# Patient Record
Sex: FEMALE | Race: OTHER | HISPANIC OR LATINO | Employment: FULL TIME | URBAN - METROPOLITAN AREA
[De-identification: names, ages, dates, MRNs, and addresses within clinical notes are randomized per-mention and may not be internally consistent; named-entity substitution may affect disease eponyms.]

---

## 2017-06-29 ENCOUNTER — ALLSCRIPTS OFFICE VISIT (OUTPATIENT)
Dept: OTHER | Facility: OTHER | Age: 42
End: 2017-06-29

## 2017-06-29 ENCOUNTER — LAB REQUISITION (OUTPATIENT)
Dept: LAB | Facility: HOSPITAL | Age: 42
End: 2017-06-29
Payer: COMMERCIAL

## 2017-06-29 DIAGNOSIS — Z01.419 ENCOUNTER FOR GYNECOLOGICAL EXAMINATION WITHOUT ABNORMAL FINDING: ICD-10-CM

## 2017-06-29 PROCEDURE — 88175 CYTOPATH C/V AUTO FLUID REDO: CPT | Performed by: OBSTETRICS & GYNECOLOGY

## 2017-06-29 PROCEDURE — 87624 HPV HI-RISK TYP POOLED RSLT: CPT | Performed by: OBSTETRICS & GYNECOLOGY

## 2017-07-05 LAB — HPV RRNA GENITAL QL NAA+PROBE: ABNORMAL

## 2017-07-12 LAB
LAB AP GYN PRIMARY INTERPRETATION: NORMAL
LAB AP LMP: NORMAL
Lab: NORMAL
PATH INTERP SPEC-IMP: NORMAL

## 2018-01-14 VITALS
BODY MASS INDEX: 24.21 KG/M2 | DIASTOLIC BLOOD PRESSURE: 60 MMHG | SYSTOLIC BLOOD PRESSURE: 102 MMHG | HEIGHT: 64 IN | WEIGHT: 141.8 LBS

## 2020-03-25 ENCOUNTER — OFFICE VISIT (OUTPATIENT)
Dept: OBGYN CLINIC | Facility: MEDICAL CENTER | Age: 45
End: 2020-03-25
Payer: COMMERCIAL

## 2020-03-25 VITALS — DIASTOLIC BLOOD PRESSURE: 62 MMHG | SYSTOLIC BLOOD PRESSURE: 100 MMHG | BODY MASS INDEX: 24.55 KG/M2 | WEIGHT: 143 LBS

## 2020-03-25 DIAGNOSIS — N92.6 IRREGULAR MENSES: ICD-10-CM

## 2020-03-25 DIAGNOSIS — B97.7 HPV (HUMAN PAPILLOMA VIRUS) INFECTION: ICD-10-CM

## 2020-03-25 DIAGNOSIS — Z71.89 ENCOUNTER FOR MEDICATION COUNSELING: ICD-10-CM

## 2020-03-25 DIAGNOSIS — R10.2 PELVIC PAIN: Primary | ICD-10-CM

## 2020-03-25 DIAGNOSIS — Z12.4 CERVICAL CANCER SCREENING: ICD-10-CM

## 2020-03-25 PROCEDURE — 99214 OFFICE O/P EST MOD 30 MIN: CPT | Performed by: NURSE PRACTITIONER

## 2020-03-25 RX ORDER — MEDROXYPROGESTERONE ACETATE 10 MG/1
TABLET ORAL
Qty: 10 TABLET | Refills: 2 | Status: SHIPPED | OUTPATIENT
Start: 2020-03-25 | End: 2021-07-16 | Stop reason: ALTCHOICE

## 2020-03-25 RX ORDER — SPIRONOLACTONE 50 MG/1
TABLET, FILM COATED ORAL
COMMUNITY
Start: 2019-12-29 | End: 2021-07-16 | Stop reason: ALTCHOICE

## 2020-03-25 NOTE — ASSESSMENT & PLAN NOTE
Neg UPT  Uterine bleeding present on today's exam  Recommended continuing to observe cycles and reviewed indications for active management  Rx was provided for Provera for 10x10 use PRN  Reviewed risks/benefits, driections for use  Advised always checking UPT prior to taking

## 2020-03-25 NOTE — PROGRESS NOTES
Assessment/Plan:    Irregular menses  Neg UPT  Uterine bleeding present on today's exam  Recommended continuing to observe cycles and reviewed indications for active management  Rx was provided for Provera for 10x10 use PRN  Reviewed risks/benefits, driections for use  Advised always checking UPT prior to taking  Pelvic pain  Vague low pelvic discomfort  Exam is unremarkable  Neg UPT  Advised pelvic US  Consider discomfort may be 2ndary to irreg cycle  Denies STI concerns and declines testing  Will advise as indicated with US  Cervical cancer screening  Overdue for pap  H/o abn paps with pos HR HPV  Will advise as indicated     Encounter for medication counseling  Currently on spironolactone for acne and thinning hair  Managed by Derm  She has noted some improvement since starting this in Jan 2020  Reviewed the teratogenic nature of this medication  She reports spouse is planning [place] [facility] [place] future  Strongly advised condoms at all times until this has been completed and post-procedure semen analysis has been completed  She verbalizes understanding and agrees with plan         Diagnoses and all orders for this visit:    Pelvic pain  -     US pelvis complete w transvaginal; Future    HPV (human papilloma virus) infection  -     Pap IG, HPV-h+lr    Cervical cancer screening  -     Pap IG, HPV-h+lr    Irregular menses  -     medroxyPROGESTERone (PROVERA) 10 mg tablet; One tablet by mouth daily for 10 days as needed to induce withdrawal bleed every three months    Encounter for medication counseling    Other orders  -     spironolactone (ALDACTONE) 50 mg tablet; TK 1 T PO BID          Subjective:      Patient ID: Kassi Min is a 45 y o  female  This patient presents with c/o pelvic discomfort and irreg menses     Pelvic discomfort present for one week    This is vague, low midline   Described as dull, crampy   Denies abn discharge, odor, STI concerns    LMP was 6 weeks ago  She denies preg concerns - neg UPT in the office today  Menses have been historically reg, q28d  Cycles seem longer since starting spironolactone in Jan     Using rhythm/withdrawal for contra currently   Last intercourse was last night  Did not use condom  Spouse is planning vas soon     Spironolactone was started by Derm for thinning hair and acne   She denies extensive counseling re: teratogenicity of this   She plans to continue     H/o pos DEVANG  Lupus was ruled out         The following portions of the patient's history were reviewed and updated as appropriate: allergies, current medications, past family history, past medical history, past social history, past surgical history and problem list     Review of Systems   Constitutional: Negative  Respiratory: Negative  Cardiovascular: Negative  Gastrointestinal: Negative  Genitourinary: Positive for menstrual problem and pelvic pain  Negative for dyspareunia, dysuria, frequency, vaginal bleeding and vaginal discharge  Musculoskeletal: Negative  Skin: Negative  Neurological: Negative  Psychiatric/Behavioral: Negative  Objective:      /62   Wt 64 9 kg (143 lb)   LMP 02/11/2020   BMI 24 55 kg/m²          Physical Exam   Constitutional: She is oriented to person, place, and time  She appears well-developed and well-nourished  HENT:   Head: Normocephalic and atraumatic  Eyes: Pupils are equal, round, and reactive to light  Neck: Normal range of motion  Pulmonary/Chest: Effort normal    Abdominal: Hernia confirmed negative in the right inguinal area and confirmed negative in the left inguinal area  Genitourinary: Rectum normal and uterus normal  There is no rash, tenderness, lesion or injury on the right labia  There is no rash, tenderness, lesion or injury on the left labia  Cervix exhibits no motion tenderness, no discharge and no friability  Right adnexum displays no mass, no tenderness and no fullness   Left adnexum displays no mass, no tenderness and no fullness  There is bleeding in the vagina  No erythema or tenderness in the vagina  No vaginal discharge found  Musculoskeletal: Normal range of motion  Lymphadenopathy:        Right: No inguinal adenopathy present  Left: No inguinal adenopathy present  Neurological: She is alert and oriented to person, place, and time  Skin: Skin is warm and dry  Psychiatric: She has a normal mood and affect   Her behavior is normal  Judgment and thought content normal

## 2020-03-25 NOTE — ASSESSMENT & PLAN NOTE
Vague low pelvic discomfort  Exam is unremarkable  Neg UPT  Advised pelvic US  Consider discomfort may be 2ndary to irreg cycle  Denies STI concerns and declines testing  Will advise as indicated with US

## 2020-03-25 NOTE — ASSESSMENT & PLAN NOTE
Currently on spironolactone for acne and thinning hair  Managed by Derm  She has noted some improvement since starting this in Jan 2020  Reviewed the teratogenic nature of this medication  She reports spouse is planning vasectomy in [facility] near future  Strongly advised condoms at all times until this has been completed and post-procedure semen analysis has been completed   She verbalizes understanding and agrees with plan

## 2020-03-30 ENCOUNTER — TELEPHONE (OUTPATIENT)
Dept: OBGYN CLINIC | Facility: CLINIC | Age: 45
End: 2020-03-30

## 2020-03-30 LAB
CYTOLOGIST CVX/VAG CYTO: ABNORMAL
DX ICD CODE: ABNORMAL
HPV I/H RISK 1 DNA CVX QL PROBE+SIG AMP: NEGATIVE
HPV LOW RISK DNA CVX QL PROBE+SIG AMP: POSITIVE
OTHER STN SPEC: ABNORMAL
PATH REPORT.FINAL DX SPEC: ABNORMAL
SL AMB NOTE:: ABNORMAL
SL AMB SPECIMEN ADEQUACY: ABNORMAL
SL AMB TEST METHODOLOGY: ABNORMAL

## 2020-04-06 ENCOUNTER — PROCEDURE VISIT (OUTPATIENT)
Dept: OBGYN CLINIC | Facility: CLINIC | Age: 45
End: 2020-04-06
Payer: COMMERCIAL

## 2020-04-06 VITALS — DIASTOLIC BLOOD PRESSURE: 60 MMHG | BODY MASS INDEX: 24.58 KG/M2 | SYSTOLIC BLOOD PRESSURE: 102 MMHG | WEIGHT: 143.2 LBS

## 2020-04-06 DIAGNOSIS — B97.7 HPV IN FEMALE: Primary | ICD-10-CM

## 2020-04-06 DIAGNOSIS — Z32.02 PREGNANCY TEST NEGATIVE: ICD-10-CM

## 2020-04-06 LAB — SL AMB POCT URINE HCG: NORMAL

## 2020-04-06 PROCEDURE — 81025 URINE PREGNANCY TEST: CPT | Performed by: OBSTETRICS & GYNECOLOGY

## 2020-04-06 PROCEDURE — 57454 BX/CURETT OF CERVIX W/SCOPE: CPT | Performed by: OBSTETRICS & GYNECOLOGY

## 2020-04-06 RX ORDER — THYROID, PORCINE 60 MG/1
95 TABLET ORAL DAILY
COMMUNITY

## 2020-04-07 LAB
PATH REPORT.SITE OF ORIGIN SPEC: NORMAL
PAYMENT PROCEDURE: NORMAL
SL AMB .: NORMAL

## 2020-04-08 ENCOUNTER — TELEPHONE (OUTPATIENT)
Dept: OBGYN CLINIC | Facility: CLINIC | Age: 45
End: 2020-04-08

## 2020-07-22 ENCOUNTER — HOSPITAL ENCOUNTER (OUTPATIENT)
Dept: RADIOLOGY | Facility: HOSPITAL | Age: 45
Discharge: HOME | End: 2020-07-22
Payer: COMMERCIAL

## 2020-07-22 DIAGNOSIS — R10.2 PELVIC PAIN: ICD-10-CM

## 2020-07-22 PROCEDURE — 76830 TRANSVAGINAL US NON-OB: CPT

## 2020-07-22 PROCEDURE — 76856 US EXAM PELVIC COMPLETE: CPT

## 2020-07-28 ENCOUNTER — TELEPHONE (OUTPATIENT)
Dept: OBGYN CLINIC | Facility: CLINIC | Age: 45
End: 2020-07-28

## 2020-07-28 NOTE — TELEPHONE ENCOUNTER
I spoke with Moe Later, she says she is not super uncomfortable and will keep an eye on it  If she feels the need she will schedule a follow up  She went for the US because she had some pain while working out, pressed on an area and thought she felt a lump that moved when she pressed on it  She says she thinks she felt the fibroid

## 2020-07-28 NOTE — TELEPHONE ENCOUNTER
----- Message from Jeni Dunaway [place] sent at 7/27/2020  9:21 AM EDT -----  Pelvic US was ordered for c/o pelvic pain in 3/2020  Ovaries are normal bilat   Uterus with several very small likely fibroids - given small size these are not likely the cause of her pain  Please inquire as to how she is feeling since she was seen in the spring and offer a f/u visit to investigate further if she is still having pain

## 2021-07-16 ENCOUNTER — ANNUAL EXAM (OUTPATIENT)
Dept: OBGYN CLINIC | Facility: CLINIC | Age: 46
End: 2021-07-16
Payer: COMMERCIAL

## 2021-07-16 VITALS — SYSTOLIC BLOOD PRESSURE: 110 MMHG | WEIGHT: 141 LBS | DIASTOLIC BLOOD PRESSURE: 60 MMHG | BODY MASS INDEX: 24.2 KG/M2

## 2021-07-16 DIAGNOSIS — B97.7 HPV (HUMAN PAPILLOMA VIRUS) INFECTION: ICD-10-CM

## 2021-07-16 DIAGNOSIS — Z12.4 CERVICAL CANCER SCREENING: ICD-10-CM

## 2021-07-16 DIAGNOSIS — R87.618 ABNORMAL PAPANICOLAOU SMEAR OF CERVIX WITH POSITIVE HUMAN PAPILLOMA VIRUS (HPV) TEST: ICD-10-CM

## 2021-07-16 DIAGNOSIS — Z12.31 ENCOUNTER FOR SCREENING MAMMOGRAM FOR MALIGNANT NEOPLASM OF BREAST: ICD-10-CM

## 2021-07-16 DIAGNOSIS — Z01.419 ENCOUNTER FOR GYNECOLOGICAL EXAMINATION (GENERAL) (ROUTINE) WITHOUT ABNORMAL FINDINGS: Primary | ICD-10-CM

## 2021-07-16 PROBLEM — N92.6 IRREGULAR MENSES: Status: RESOLVED | Noted: 2020-03-25 | Resolved: 2021-07-16

## 2021-07-16 PROBLEM — R10.2 PELVIC PAIN: Status: RESOLVED | Noted: 2020-03-25 | Resolved: 2021-07-16

## 2021-07-16 PROCEDURE — 88175 CYTOPATH C/V AUTO FLUID REDO: CPT | Performed by: NURSE PRACTITIONER

## 2021-07-16 PROCEDURE — 99396 PREV VISIT EST AGE 40-64: CPT | Performed by: NURSE PRACTITIONER

## 2021-07-16 PROCEDURE — 87624 HPV HI-RISK TYP POOLED RSLT: CPT | Performed by: NURSE PRACTITIONER

## 2021-07-16 NOTE — ASSESSMENT & PLAN NOTE
Normal findings on routine gyn exam  Advised monthly SBE, annual CBE and annual screening mammo  Reviewed ASCCP guidelines and recommended pap with cotesting was collected today  STI testing was offered and the patient declines; she reports low risk  The patient desires to continue current contraceptive method (withdrawal)  Diet/activity recommendations reviewed  RTO in one year or sooner PRN

## 2021-07-16 NOTE — PROGRESS NOTES
Assessment/Plan:    Encounter for gynecological examination (general) (routine) without abnormal findings  Normal findings on routine gyn exam  Advised monthly SBE, annual CBE and annual screening mammo  Reviewed ASCCP guidelines and recommended pap with cotesting was collected today  STI testing was offered and the patient declines; she reports low risk  The patient desires to continue current contraceptive method (withdrawal)  Diet/activity recommendations reviewed  RTO in one year or sooner PRN  HPV (human papilloma virus) infection  Pap hx is as follows:   6/2017: normal cyto, pos HR HPV (neg 16/18)  3/2020: normal cyto, pos "low risk HPV" (neg 16/18)   4/2020: colpo benign     Pap with cotesting was collected today        Diagnoses and all orders for this visit:    Encounter for gynecological examination (general) (routine) without abnormal findings    HPV (human papilloma virus) infection  -     Liquid-based pap, diagnostic    Cervical cancer screening  -     Liquid-based pap, diagnostic    Encounter for screening mammogram for malignant neoplasm of breast  -     Mammo screening bilateral w 3d & cad; Future    Abnormal Papanicolaou smear of cervix with positive human papilloma virus (HPV) test          Subjective:      Patient ID: Melinda Child is a 39 y o  female  This patient presents for routine annual gyn exam    Medically stable  Has been having annual mammos out of network - all WNL  See pap hx under A&P  She denies acute gyn complaints  Menses are regular and light to mod  She denies pelvic pain, breast concerns, abnormal discharge, bowel/bladder dysfunction, depression/anxiety   and monogamous  She denies STI concerns  Withdrawal for contraception       Kids are well       The following portions of the patient's history were reviewed and updated as appropriate: allergies, current medications, past family history, past medical history, past social history, past surgical history and problem list     Review of Systems   Constitutional: Negative  Respiratory: Negative  Cardiovascular: Negative  Gastrointestinal: Negative  Genitourinary: Negative  Musculoskeletal: Negative  Skin: Negative  Neurological: Negative  Psychiatric/Behavioral: Negative  Objective:      /60   Wt 64 kg (141 lb)   LMP 07/14/2021   BMI 24 20 kg/m²          Physical Exam  Constitutional:       Appearance: She is well-developed  HENT:      Head: Normocephalic and atraumatic  Eyes:      Pupils: Pupils are equal, round, and reactive to light  Neck:      Thyroid: No thyromegaly  Cardiovascular:      Rate and Rhythm: Normal rate and regular rhythm  Heart sounds: Normal heart sounds  Pulmonary:      Effort: Pulmonary effort is normal  No respiratory distress  Breath sounds: Normal breath sounds  No wheezing or rales  Chest:      Chest wall: No mass, deformity or tenderness  Breasts: Breasts are symmetrical          Right: No inverted nipple, mass, nipple discharge, skin change or tenderness  Left: No inverted nipple, mass, nipple discharge, skin change or tenderness  Abdominal:      General: There is no distension  Palpations: Abdomen is soft  There is no hepatomegaly, splenomegaly or mass  Tenderness: There is no abdominal tenderness  There is no guarding or rebound  Genitourinary:     Labia:         Right: No rash, tenderness, lesion or injury  Left: No rash, tenderness, lesion or injury  Vagina: No foreign body  Bleeding present  No vaginal discharge, erythema or tenderness  Cervix: No cervical motion tenderness, discharge or friability  Uterus: Normal        Adnexa:         Right: No mass, tenderness or fullness  Left: No mass, tenderness or fullness  Rectum: Normal          Musculoskeletal:         General: Normal range of motion  Cervical back: Normal range of motion and neck supple  Lymphadenopathy:      Cervical: No cervical adenopathy  Skin:     General: Skin is warm and dry  Findings: No rash  Nails: There is no clubbing  Neurological:      Mental Status: She is alert and oriented to person, place, and time  Cranial Nerves: No cranial nerve deficit  Psychiatric:         Speech: Speech normal          Behavior: Behavior normal          Thought Content:  Thought content normal          Judgment: Judgment normal

## 2021-07-19 LAB
HPV HR 12 DNA CVX QL NAA+PROBE: NEGATIVE
HPV16 DNA CVX QL NAA+PROBE: NEGATIVE
HPV18 DNA CVX QL NAA+PROBE: NEGATIVE

## 2021-07-22 LAB
LAB AP GYN PRIMARY INTERPRETATION: NORMAL
Lab: NORMAL

## 2022-01-05 ENCOUNTER — OFFICE VISIT (OUTPATIENT)
Dept: OBGYN CLINIC | Facility: CLINIC | Age: 47
End: 2022-01-05
Payer: COMMERCIAL

## 2022-01-05 VITALS
WEIGHT: 148 LBS | BODY MASS INDEX: 24.66 KG/M2 | DIASTOLIC BLOOD PRESSURE: 72 MMHG | SYSTOLIC BLOOD PRESSURE: 99 MMHG | HEIGHT: 65 IN

## 2022-01-05 DIAGNOSIS — N93.9 ABNORMAL UTERINE BLEEDING (AUB): Primary | ICD-10-CM

## 2022-01-05 PROCEDURE — 99213 OFFICE O/P EST LOW 20 MIN: CPT | Performed by: STUDENT IN AN ORGANIZED HEALTH CARE EDUCATION/TRAINING PROGRAM

## 2022-01-05 RX ORDER — TRANEXAMIC ACID 650 1/1
1300 TABLET ORAL 3 TIMES DAILY
Qty: 30 TABLET | Refills: 2 | Status: SHIPPED | OUTPATIENT
Start: 2022-01-05 | End: 2022-01-10

## 2022-01-05 RX ORDER — LIOTHYRONINE SODIUM 25 UG/1
30 TABLET ORAL DAILY
COMMUNITY

## 2022-01-05 RX ORDER — GONADOTROPHIN, CHORIONIC 5000 UNIT
KIT INTRAMUSCULAR
COMMUNITY
Start: 2021-11-15

## 2022-01-05 NOTE — ASSESSMENT & PLAN NOTE
Reviewed bleeding precautions  Interested in non-hormonal options to manage, rx sent for Lysteda today  Previously working with [facility] with IVF and clomid, currently taking a break and attempting to conceive without intervention, continuing prenatal vitamin    On exam ?posterior fibroid, known history of fibroid, reviewed may be contributing to heavy menses, TVUS ordered today, will be in touch via [employer] with results

## 2022-01-05 NOTE — PROGRESS NOTES
PROBLEM VISIT  Chief Complaint   Patient presents with    Menorrhagia     pt here for heavy cycles  she said sdhe noticed within the past couple of months cycles are getting heavier than normal  LMP: 1/3  she gets clots and notices a "littel pinch" when she sits down  denies any cramping  she said she needs to double up on tampons and pads- cant use just a tampon  she said she bled through her pants a couple of days ago        Assessment/Plan   42 y o  [id number] with new onset of heavy menses  Problem List Items Addressed This Visit        Genitourinary    Abnormal uterine bleeding (AUB) - Primary     Reviewed bleeding precautions  Interested in non-hormonal options to manage, rx sent for Fernando today  Previously working with [facility] with IVF and clomid, currently taking a break and attempting to conceive without intervention, continuing prenatal vitamin    On exam ?posterior fibroid, known history of fibroid, reviewed may be contributing to heavy menses, TVUS ordered today, will be in touch via [employer] with results         Relevant Medications    Tranexamic Acid 650 MG TABS    Other Relevant Orders    US pelvis complete w transvaginal        Return for annual or PRN     ----------------------------------------------  History of Present Illness     Deeanna Closs is a 42 y o  female [id number] who presents to discuss new onset of heavy menses  First two days or so extremely heavy, bled through her pants recently  Currently on period  Not currently using anything for contraception as trying to conceive, recently did clomid and IVF through [address]  REVIEW OF SYSTEMS  12 point review of systems negative aside from HPI      Past Medical History:   Diagnosis Date    Abnormal Pap smear of cervix     Hypothyroidism     followed by Endocrine      delivery (maternal condition)     32 week delivery - chorioamnionitis      Past Surgical History:   Procedure Laterality Date    BUNIONECTOMY      COLPOSCOPY   normal      OB History        7    Para   6    Term   4       2    AB   1    Living   6       SAB        IAB   1    Ectopic        Multiple        Live Births   6                     Current Outpatient Medications:     thyroid (ARMOUR) 65 MG tablet, Take 95 mg by mouth daily  , Disp: , Rfl:     clomiPHENE (CLOMID) 50 mg tablet, , Disp: , Rfl:     liothyronine (CYTOMEL) 25 mcg tablet, Take 30 mcg by mouth daily (Patient not taking: Reported on 2022 ), Disp: , Rfl:     Novarel 5000 units SOLR, , Disp: , Rfl:     Tranexamic Acid 650 MG TABS, Take 2 tablets (1,300 mg total) by mouth 3 (three) times a day for 5 days, Disp: 30 tablet, Rfl: 2  No Known Allergies    Objective   Vitals: Blood pressure 99/72, height 5' 5" (1 651 m), weight 67 1 kg (148 lb), last menstrual period 2022  Body mass index is 24 63 kg/m²  General: NAD, AAOx3  Heart: non-tachycardic  Lungs: non-labored breathing, symmetric chest rise    Vagina: pink moist well-rugated, menstrual blood in vaginal vault  Cervix without lesion, parous  On exam, ? posterior fibroid appreciated, mobile uterus, normal size  No adnexal mass or tenderness noted  Lab Results:   No visits with results within 1 Day(s) from this visit     Latest known visit with results is:   Annual Exam on 2021   Component Date Value    Case Report 2021                      Value:Gynecologic Cytology Report                       Case: Guerda Christianson Provider:  BRYAN Gonzales       Collected:           2021 1332              Ordering Location:     [facility]      Received:            20[address]                                     [facility]                                                                               Temo Chiang Screen:          [place], [address] Specimen:    LIQUID-BASED PAP, DIAGNOSTIC, Cervix                                                       Primary Interpretation 07/16/2021 Negative for intraepithelial lesion or malignancy     Specimen Adequacy 07/16/2021 Satisfactory for evaluation  Absence of endocervical/transformation zone component   Additional Information 07/16/2021                      Value: This result contains rich text formatting which cannot be displayed here      HPV Other HR 07/16/2021 Negative     HPV16 07/16/2021 Negative     HPV18 07/16/2021 Negative

## 2022-02-26 ENCOUNTER — HOSPITAL ENCOUNTER (OUTPATIENT)
Dept: RADIOLOGY | Facility: HOSPITAL | Age: 47
Discharge: HOME | End: 2022-02-26
Payer: COMMERCIAL

## 2022-02-26 DIAGNOSIS — N93.9 ABNORMAL UTERINE BLEEDING (AUB): ICD-10-CM

## 2022-02-26 PROCEDURE — 76830 TRANSVAGINAL US NON-OB: CPT

## 2022-02-26 PROCEDURE — 76856 US EXAM PELVIC COMPLETE: CPT

## 2022-03-03 ENCOUNTER — TELEPHONE (OUTPATIENT)
Dept: OBGYN CLINIC | Age: 47
End: 2022-03-03

## 2022-03-03 NOTE — TELEPHONE ENCOUNTER
Spoke with Sumeet Barbosa regarding ultrasound findings - submucosal fibroid likely contributing to recent heavier menses  Has not yet needed TXA, has in case  No intervention needed regarding fibroids unless pursuing SEEMA workup